# Patient Record
(demographics unavailable — no encounter records)

---

## 2025-02-04 NOTE — PHYSICAL EXAM
[de-identified] : Right wrist E/F50/50 without pain.  Basal joint manipulation minimal crepitus no pain. Right hand  index DIP joint: 4 mm hard mass over the radial aspect of DIP joint. Mass does not move with manipulation. Mass does not move with DIP flexion suggesting mass associated with condyle of middle phalanx. Mass nontender.  Full-thickness skin overlying. Small Heberden's node radially. Small Heberden's nodes middle and little finger at DIP joints. No other notable MP PIP or DIP joint with full composite active flexion extension of fingers. No A1 pulley tenderness and no triggering in any finger. No additional pertinent positive contributory findings.  Left wrist Good motion no localizing findings. Basal joint manipulation minimal crepitus no pain.  Minimal swelling. Left hand  no pertinent MP, PIP, or DIP joint contributory findings, except some Heberden's nodes: Index and little; none are clinically painful. No A1 pulley tenderness and no triggering in any finger.  Neurologic: Median, ulnar, and radial motor and sensory are intact.  Skin: No cyanosis, clubbing, or rashes. Vascular: Radial pulses intact. Lymphatic: No streaking or epitrochlear adenopathy. The patient is awake, alert, and oriented. Affect appropriate. Cooperative.  [de-identified] : Submitted radiographs right index finger 3 views on x-ray film taken at Dr. Henry's office. Patient name is on the xray film but not legible.   Patient attests to this being her right hand index finger radiograph. Ballpoint pen points to the index finger DIP joint. 3 views of index. Dorsal osteophyte distal phalanx at extensor tubercle. Slight narrowing ulnar aspect DIP joint.  No other notable findings index.  Index MP slightly narrow.  PIP good space maintained.  Middle finger MP PIP without change.  DIP with minimal narrowing. No other obvious changes.  Radiolucent specks on film most likely artifact.

## 2025-02-04 NOTE — CONSULT LETTER
[Dear  ___] : Dear  [unfilled], [Consult Letter:] : I had the pleasure of evaluating your patient, [unfilled]. [FreeTextEntry1] : The patient was seen in hand consultation today. A copy of my office note is enclosed for your review with the patient's knowledge and consent.  Sincerely,  Alejandro Mercado MD Chief, Hand Surgery Residency  ([phone]) Department of Orthopaedic Surgery  [facility] Professor of Orthopaedic Surgery Benitez CALVILLO at [facility]

## 2025-02-04 NOTE — HISTORY OF PRESENT ILLNESS
[FreeTextEntry1] : The patient is 78 yo RHD female who presents as a NEW PATIENT for hand evaluation. 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 Henry because of a mass over the dorsal ulnar aspect right index finger DIP joint. Patient is under care at [facility] by hematology for this condition.  Patient has occasional discomfort. Patient has noticed a bump in this area for approximately 5 years.  Patient notes some discomfort from time to time. Was under the impression there was related to arthritis. Patient not aware of any other hand complaints. Patient not aware of numbness or tingling. Patient not aware of locking or triggering.

## 2025-02-04 NOTE — HISTORY OF PRESENT ILLNESS
[FreeTextEntry1] : The patient is 76 yo RHD female who presents as a NEW PATIENT for hand evaluation. 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 Henry because of a mass over the dorsal ulnar aspect right index finger DIP joint. Patient is under care at [facility] by hematology for this condition.  Patient has occasional discomfort. Patient has noticed a bump in this area for approximately 5 years.  Patient notes some discomfort from time to time. Was under the impression there was related to arthritis. Patient not aware of any other hand complaints. Patient not aware of numbness or tingling. Patient not aware of locking or triggering.

## 2025-02-04 NOTE — ASSESSMENT
[FreeTextEntry1] : The patient has a mass over the dorsal ulnar aspect right index finger DIP joint.  Except infrequently, the mass is not tender.  It has not changed in size to any notable degree according to the patient.  Radiographs show arthritic changes at right index DIP joint with sclerosis, narrowing and osteophyte formation.  Percutaneous perforation the mass was carried out no fluid was obtained.  The tip of needle contacted bone 2 mm deep to skin surface indicating osteophyte rather than mucous cyst. Because the patient has no pain to any notable degree and no notable interference with ADLs as a result of this mass, no intervention is indicated or recommended.  Education provided. 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 Patient has no other notable active hand problem requiring treatment. Patient has no numbness, tingling or triggering.  Return as needed. Prognosis uncertain but relatively good unless negative condition develops.  A lengthy and detailed discussion was held with the patient regarding analysis, treatment, and recommendations. All questions have been answered. At the conclusion the patient expressed acceptance, understanding and agreement with the plan.

## 2025-02-04 NOTE — PHYSICAL EXAM
[de-identified] : Right wrist E/F50/50 without pain.  Basal joint manipulation minimal crepitus no pain. Right hand  index DIP joint: 4 mm hard mass over the radial aspect of DIP joint. Mass does not move with manipulation. Mass does not move with DIP flexion suggesting mass associated with condyle of middle phalanx. Mass nontender.  Full-thickness skin overlying. Small Heberden's node radially. Small Heberden's nodes middle and little finger at DIP joints. No other notable MP PIP or DIP joint with full composite active flexion extension of fingers. No A1 pulley tenderness and no triggering in any finger. No additional pertinent positive contributory findings.  Left wrist Good motion no localizing findings. Basal joint manipulation minimal crepitus no pain.  Minimal swelling. Left hand  no pertinent MP, PIP, or DIP joint contributory findings, except some Heberden's nodes: Index and little; none are clinically painful. No A1 pulley tenderness and no triggering in any finger.  Neurologic: Median, ulnar, and radial motor and sensory are intact.  Skin: No cyanosis, clubbing, or rashes. Vascular: Radial pulses intact. Lymphatic: No streaking or epitrochlear adenopathy. The patient is awake, alert, and oriented. Affect appropriate. Cooperative.  [de-identified] : Submitted radiographs right index finger 3 views on x-ray film taken at Dr. Henry's office. Patient name is on the xray film but not legible.   Patient attests to this being her right hand index finger radiograph. Ballpoint pen points to the index finger DIP joint. 3 views of index. Dorsal osteophyte distal phalanx at extensor tubercle. Slight narrowing ulnar aspect DIP joint.  No other notable findings index.  Index MP slightly narrow.  PIP good space maintained.  Middle finger MP PIP without change.  DIP with minimal narrowing. No other obvious changes.  Radiolucent specks on film most likely artifact.

## 2025-02-04 NOTE — PROCEDURE
[FreeTextEntry1] : Diagnosis: Mass right index finger DIP joint. Indication: Diagnostic tap After discussion of treatment options including risks, complications, expectation, alternatives, and after patient verbal consent, following verbal timeout site verification, aspiration of mass of dorsal ulnar aspect right index finger DIP joint was performed. . The area was prepped sterilely, and with sterile technique, aspiration was performed with 25-gauge needle. As a result of the aspiration and manual compression no fluid was expressed or obtained.  No bleeding.  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 The patient tolerated the procedure well without complication. Sterile dressing applied. The following instructions were given to the patient: The patient should be cautious in activities for two weeks and then increase to full activities.  Thereafter, the patient should return if symptoms continue, or if they elmer and recur subsequently. If symptoms do not recur, the patient need not return and can be seen on an as needed basis. The patient has expressed understanding and acceptance of analysis, treatment, and recommendations.  All questions answered.

## 2025-02-04 NOTE — CONSULT LETTER
[Dear  ___] : Dear  [unfilled], [Consult Letter:] : I had the pleasure of evaluating your patient, [unfilled]. [FreeTextEntry1] : The patient was seen in hand consultation today. A copy of my office note is enclosed for your review with the patient's knowledge and consent.  Sincerely,  Alejandro Mercado MD Chief, Hand Surgery Residency  ([phone]) Department of Orthopaedic Surgery  [facility] Professor of Orthopaedic Surgery Benitez CALVILLO at [facility]